# Patient Record
Sex: MALE | URBAN - METROPOLITAN AREA
[De-identification: names, ages, dates, MRNs, and addresses within clinical notes are randomized per-mention and may not be internally consistent; named-entity substitution may affect disease eponyms.]

---

## 2020-03-15 ENCOUNTER — HOSPITAL ENCOUNTER (OUTPATIENT)
Dept: TELEMEDICINE | Facility: HOSPITAL | Age: 80
Discharge: HOME | End: 2020-03-15

## 2020-03-15 DIAGNOSIS — I63.9 CEREBROVASCULAR ACCIDENT (CVA), UNSPECIFIED MECHANISM: ICD-10-CM

## 2020-03-15 PROCEDURE — G0426 PR INPT TELEHEALTH CONSULT 50M: ICD-10-PCS | Mod: GT,,, | Performed by: PSYCHIATRY & NEUROLOGY

## 2020-03-15 PROCEDURE — G0426 INPT/ED TELECONSULT50: HCPCS | Mod: GT,,, | Performed by: PSYCHIATRY & NEUROLOGY

## 2020-03-15 NOTE — CONSULTS
[facility]  Vascular Neurology  Comprehensive Stroke Center  Tele-Consultation Note      Consults    Consulting Provider: ANGELICA WOLFE  Current Providers  No providers found    Patient Location: [facility] Department  Spoke hospital nurse at bedside with patient assisting consultant.     Patient information was obtained from patient.         Assessment/Plan:     STROKE DOCUMENTATION     Acute Stroke Times:   Acute Stroke Times   Last Known Normal Date: 03/15/20  Last Known Normal Time: 0830  Symptom Onset Date: 03/15/20  Symptom Onset Time: 0830  Stroke Team Called Date: 03/15/20  Stroke Team Called Time: 1207  Stroke Team Arrival Date: 03/15/20  Stroke Team Arrival Time: 1210    NIH Scale:  Interval: baseline  1a. Level of Consciousness: 0-->Alert, keenly responsive  1b. LOC Questions: 2-->Answers neither question correctly  1c. LOC Commands: 2-->Performs neither task correctly  2. Best Gaze: 0-->Normal  3. Visual: 0-->No visual loss  4. Facial Palsy: 0-->Normal symmetrical movements  5a. Motor Arm, Left: 0-->No drift, limb holds 90 (or 45) degrees for full 10 secs  5b. Motor Arm, Right: 0-->No drift, limb holds 90 (or 45) degrees for full 10 secs  6a. Motor Leg, Left: 0-->No drift, leg holds 30 degree position for full 5 secs  6b. Motor Leg, Right: 1-->Drift, leg falls by the end of the 5-sec period but does not hit bed  7. Limb Ataxia: 0-->Absent  8. Sensory: 0-->Normal, no sensory loss  9. Best Language: 1-->Mild-to-moderate aphasia, some obvious loss of fluency or facility of comprehension, without significant limitation on ideas expressed or form of expression. Reduction of speech and/or comprehension, however, makes conversation. . . (see row details)  10. Dysarthria: 0-->Normal  11. Extinction and Inattention (formerly Neglect): 0-->No abnormality  Total (NIH Stroke Scale): 6      Modified Guadalupe    [place] Coma Scale:    ABCD2 Score:    [id number] Score:   HAS -BLED Score:   ICH Score:   Hunt & Arteaga Classification:       Diagnoses:   CVA (cerebral vascular accident)  Acute encephalopathy not likely ischemic in origin. No tpa due to eliquis. VAN negative not likely candidate for intervention.  Antithrombotics for secondary stroke prevention: Anticoagulants: Eliquis    Statins for secondary stroke prevention and hyperlipidemia, if present:   Statins: Atorvastatin- 40 mg daily    Aggressive risk factor modification: TBD     Rehab efforts: The patient has been evaluated by a stroke team provider and the therapy needs have been fully considered based off the presenting complaints and exam findings. The following therapy evaluations are needed: PT evaluate and treat, OT evaluate and treat, SLP evaluate and treat, PM&R evaluate for appropriate placement    Diagnostics ordered/pending: CTA Head to assess vasculature , CTA Neck/Arch to assess vasculature, HgbA1C to assess blood glucose levels, Lipid Profile to assess cholesterol levels, TTE to assess cardiac function/status , TSH to assess thyroid function    VTE prophylaxis: None: Reason for No Pharmacological VTE Prophylaxis: Currently on anticoagulation    BP parameters: Infarct: No intervention, SBP <220            There were no vitals taken for this visit.  Alteplase Eligible?: No  Alteplase Recommendation: Alteplase not recommended due to Full dose anticoagulation   Possible Interventional Revascularization Candidate? No; No significant neurological deficit    Disposition Recommendation: admit to inpatient    Subjective:     History of Present Illness:  79 year old male with extensive medical history presenting from nursing home for acute onset confusion and aphasia since approx 0830 AM 03/15/2020. Symptoms persist at time of exam. Patient is taking Eliquis daily as prescribed.      Woke up with symptoms?: no    Recent bleeding noted:  no  Does the patient take any Blood Thinners? yes  Medications: Anticoagulants:  apixaban/Eliquis      Past Medical History: tia and Heart Problems    Past Surgical History: unable to obtain    Family History: no relevant history    Social History: unable to obtain    Allergies: Allergies have not been reviewed No relevant allergies reported    Review of Systems   Constitutional: Negative for fever.   HENT: Negative for trouble swallowing.    Eyes: Negative for visual disturbance.   Respiratory: Negative for shortness of breath.    Cardiovascular: Negative for chest pain.   Gastrointestinal: Negative for abdominal pain, anal bleeding, blood in stool, nausea and vomiting.   Musculoskeletal: Positive for arthralgias.   Neurological: Negative for numbness and headaches.   Psychiatric/Behavioral: Positive for confusion.     Objective:   Vitals: There were no vitals taken for this visit. BP: 125/62, Respiratory Rate: 18 and Heart Rate: 80    CT READ: No    Physical Exam   Constitutional: He appears well-nourished.   HENT:   Head: Atraumatic.   Eyes: EOM are normal.   Cardiovascular: Normal rate.   Pulmonary/Chest: Effort normal.   Neurological: He is alert. GCS eye subscore is 4. GCS verbal subscore is 4. GCS motor subscore is 6.   Does not consistently follow commands or answer orientation questions correctly  RLE weaker than others, but diffusely weak             Recommended the emergency room physician to have a brief discussion with the patient and/or family if available regarding the risks and benefits of treatment, and to briefly document the occurrence of that discussion in his clinical encounter note.     The attending portion of this evaluation, treatment, and documentation was performed per Diego Shaffer MD via audiovisual.    Billing code:  (non-intervention mild to moderate stroke, TIA, some mimics)    · This patient has a critical neurological condition/illness, with some potential for high  morbidity and mortality.  · There is a moderate probability for acute neurological change leading to clinical and possibly life-threatening deterioration requiring highest level of physician preparedness for urgent intervention.  · Care was coordinated with other physicians involved in the patient's care.  · Radiologic studies and laboratory data were reviewed and interpreted, and plan of care was re-assessed based on the results.  · Diagnosis, treatment options and prognosis may have been discussed with the patient and/or family members or caregiver.      In your opinion, this was a: Tier 1 Van Negative    Consult End Time: 12:34 PM     Diego Shaffer MD  Comprehensive Stroke Center  Vascular Neurology   [facility]

## 2020-03-15 NOTE — HPI
79 year old male with extensive medical history presenting from nursing home for acute onset confusion and aphasia since approx 0830 AM 03/15/2020. Symptoms persist at time of exam. Patient is taking Eliquis daily as prescribed.

## 2020-03-15 NOTE — SUBJECTIVE & OBJECTIVE
Woke up with symptoms?: no    Recent bleeding noted: no  Does the patient take any Blood Thinners? yes  Medications: Anticoagulants:  apixaban/Eliquis      Past Medical History: tia and Heart Problems    Past Surgical History: unable to obtain    Family History: no relevant history    Social History: unable to obtain    Allergies: Allergies have not been reviewed No relevant allergies reported    Review of Systems   Constitutional: Negative for fever.   HENT: Negative for trouble swallowing.    Eyes: Negative for visual disturbance.   Respiratory: Negative for shortness of breath.    Cardiovascular: Negative for chest pain.   Gastrointestinal: Negative for abdominal pain, anal bleeding, blood in stool, nausea and vomiting.   Musculoskeletal: Positive for arthralgias.   Neurological: Negative for numbness and headaches.   Psychiatric/Behavioral: Positive for confusion.     Objective:   Vitals: There were no vitals taken for this visit. BP: 125/62, Respiratory Rate: 18 and Heart Rate: 80    CT READ: No    Physical Exam   Constitutional: He appears well-nourished.   HENT:   Head: Atraumatic.   Eyes: EOM are normal.   Cardiovascular: Normal rate.   Pulmonary/Chest: Effort normal.   Neurological: He is alert. GCS eye subscore is 4. GCS verbal subscore is 4. GCS motor subscore is 6.   Does not consistently follow commands or answer orientation questions correctly  RLE weaker than others, but diffusely weak